# Patient Record
Sex: MALE | Race: WHITE | NOT HISPANIC OR LATINO | URBAN - METROPOLITAN AREA
[De-identification: names, ages, dates, MRNs, and addresses within clinical notes are randomized per-mention and may not be internally consistent; named-entity substitution may affect disease eponyms.]

---

## 2024-02-22 ENCOUNTER — OFFICE VISIT (OUTPATIENT)
Dept: PRIMARY CARE | Facility: CLINIC | Age: 64
End: 2024-02-22
Payer: COMMERCIAL

## 2024-02-22 VITALS
DIASTOLIC BLOOD PRESSURE: 78 MMHG | SYSTOLIC BLOOD PRESSURE: 112 MMHG | WEIGHT: 197 LBS | TEMPERATURE: 98.1 F | HEIGHT: 70 IN | HEART RATE: 78 BPM | BODY MASS INDEX: 28.2 KG/M2

## 2024-02-22 DIAGNOSIS — R73.02 IGT (IMPAIRED GLUCOSE TOLERANCE): ICD-10-CM

## 2024-02-22 DIAGNOSIS — A63.0 VENEREAL WARTS: ICD-10-CM

## 2024-02-22 DIAGNOSIS — E78.2 MIXED HYPERLIPIDEMIA: ICD-10-CM

## 2024-02-22 DIAGNOSIS — K58.9 IRRITABLE BOWEL SYNDROME, UNSPECIFIED TYPE: ICD-10-CM

## 2024-02-22 DIAGNOSIS — Z00.00 WELLNESS EXAMINATION: ICD-10-CM

## 2024-02-22 DIAGNOSIS — D68.59 THROMBOPHILIA (MULTI): ICD-10-CM

## 2024-02-22 DIAGNOSIS — Z12.11 COLON CANCER SCREENING: ICD-10-CM

## 2024-02-22 DIAGNOSIS — Z12.5 PROSTATE CANCER SCREENING: ICD-10-CM

## 2024-02-22 DIAGNOSIS — R73.03 PREDIABETES: ICD-10-CM

## 2024-02-22 DIAGNOSIS — Z00.00 ENCOUNTER FOR ROUTINE HISTORY AND PHYSICAL EXAMINATION OF ADULT: Primary | ICD-10-CM

## 2024-02-22 PROCEDURE — 99396 PREV VISIT EST AGE 40-64: CPT | Performed by: INTERNAL MEDICINE

## 2024-02-22 PROCEDURE — 1036F TOBACCO NON-USER: CPT | Performed by: INTERNAL MEDICINE

## 2024-02-22 RX ORDER — WARFARIN SODIUM 5 MG/1
5 TABLET ORAL
COMMUNITY
Start: 2023-11-21

## 2024-02-22 ASSESSMENT — ENCOUNTER SYMPTOMS
WHEEZING: 0
COUGH: 0
EYE REDNESS: 0
PSYCHIATRIC NEGATIVE: 1
FEVER: 0
APNEA: 0
ARTHRALGIAS: 0
ACTIVITY CHANGE: 0
EYE PAIN: 0
PALPITATIONS: 0
PHOTOPHOBIA: 0
NUMBNESS: 0
LIGHT-HEADEDNESS: 0
EYE DISCHARGE: 0
CHEST TIGHTNESS: 0
STRIDOR: 0
CHILLS: 0
FACIAL ASYMMETRY: 0
ENDOCRINE NEGATIVE: 1
FATIGUE: 0
CHOKING: 0
SHORTNESS OF BREATH: 0
APPETITE CHANGE: 0
DIZZINESS: 0
HEMATOLOGIC/LYMPHATIC NEGATIVE: 1
EYE ITCHING: 0
ALLERGIC/IMMUNOLOGIC NEGATIVE: 1
HEADACHES: 0
DIAPHORESIS: 0
UNEXPECTED WEIGHT CHANGE: 0
GASTROINTESTINAL NEGATIVE: 1

## 2024-02-22 NOTE — PROGRESS NOTES
"Subjective   Patient ID: Guicho Ferrell is a 63 y.o. male who presents for Annual Exam.    HPI check up   Pe  HYPERCOAGULABLE   LIFE LONG COUMADIN    DONE WELL   PSHX RIGHT WRIST REPAIR  TONSILL    FMHX   POS CLOTS    SOCHX      WORK   NO TOBACCO  NO ETOH   CARDIO 10 K STEPS PER DAY   DIET LESS GREENS   KIDS X 2 H    PRE MED   COLONOSCOPY  YEARS    DUE   PSA   NL        Review of Systems   Constitutional:  Negative for activity change, appetite change, chills, diaphoresis, fatigue, fever and unexpected weight change.   HENT: Negative.     Eyes:  Negative for photophobia, pain, discharge, redness, itching and visual disturbance.   Respiratory:  Negative for apnea, cough, choking, chest tightness, shortness of breath, wheezing and stridor.    Cardiovascular:  Negative for chest pain, palpitations and leg swelling.   Gastrointestinal: Negative.    Endocrine: Negative.    Genitourinary: Negative.    Musculoskeletal:  Negative for arthralgias.   Skin: Negative.    Allergic/Immunologic: Negative.    Neurological:  Negative for dizziness, facial asymmetry, light-headedness, numbness and headaches.   Hematological: Negative.         SEE SULTANA    Psychiatric/Behavioral: Negative.         Objective   /78   Pulse 78   Temp 36.7 °C (98.1 °F)   Ht 1.778 m (5' 10\")   Wt 89.4 kg (197 lb)   BMI 28.27 kg/m²     Physical Exam  Constitutional:       Appearance: Normal appearance.   HENT:      Head: Normocephalic and atraumatic.      Right Ear: Tympanic membrane normal.      Left Ear: Tympanic membrane normal.      Nose: Nose normal.   Eyes:      Extraocular Movements: Extraocular movements intact.      Conjunctiva/sclera: Conjunctivae normal.      Pupils: Pupils are equal, round, and reactive to light.   Cardiovascular:      Rate and Rhythm: Normal rate and regular rhythm.      Pulses: Normal pulses.      Heart sounds: Normal heart sounds.   Pulmonary:      Effort: Pulmonary effort is normal.      Breath sounds: " Normal breath sounds.   Abdominal:      General: Abdomen is flat. Bowel sounds are normal.      Palpations: Abdomen is soft.   Genitourinary:     Penis: Normal.       Testes: Normal.      Prostate: Normal.   Musculoskeletal:         General: Normal range of motion.      Cervical back: Normal range of motion and neck supple.   Skin:     General: Skin is warm and dry.   Neurological:      General: No focal deficit present.      Mental Status: Mental status is at baseline.   Psychiatric:         Mood and Affect: Mood normal.         Behavior: Behavior normal.         Thought Content: Thought content normal.         Judgment: Judgment normal.     RECTAL NL PROSTATE FOBT     Assessment/Plan   Diagnoses and all orders for this visit:  Encounter for routine history and physical examination of adult  -     TSH with reflex to Free T4 if abnormal; Future  -     Hemoglobin A1C; Future  Thrombophilia (CMS/HCC)  Comments:  VERY STABLE ON HIS COUMADIN   GOES TO [facility]  Orders:  -     TSH with reflex to Free T4 if abnormal; Future  -     Hemoglobin A1C; Future  Irritable bowel syndrome, unspecified type  Comments:  GOOD  Orders:  -     TSH with reflex to Free T4 if abnormal; Future  -     Hemoglobin A1C; Future  Colon cancer screening  -     Cologuard® colon cancer screening; Future  -     TSH with reflex to Free T4 if abnormal; Future  -     Hemoglobin A1C; Future  Venereal warts  Comments:  SEE DERM  Orders:  -     TSH with reflex to Free T4 if abnormal; Future  -     Hemoglobin A1C; Future  Mixed hyperlipidemia  -     TSH with reflex to Free T4 if abnormal; Future  -     Hemoglobin A1C; Future  IGT (impaired glucose tolerance)  -     TSH with reflex to Free T4 if abnormal; Future  -     Hemoglobin A1C; Future  Wellness examination  -     CBC; Future  -     Lipid Panel; Future  -     Comprehensive Metabolic Panel; Future  -     TSH with reflex to Free T4 if abnormal; Future  -     Hemoglobin A1C; Future  Prostate  cancer screening  -     Prostate Specific Antigen, Screen; Future  -     TSH with reflex to Free T4 if abnormal; Future  -     Hemoglobin A1C; Future  Prediabetes  -     TSH with reflex to Free T4 if abnormal; Future  -     Hemoglobin A1C; Future  -     Hemoglobin A1C; Future  Other orders  -     Follow Up In Primary Care - Established; Future

## 2024-03-05 LAB
ESTIMATED AVERAGE GLUCOSE FOR HBA1C EXTERNAL: 108 MG/DL
HEMOGLOBIN A1C/HEMOGLOBIN TOTAL IN BLOOD EXTERNAL: 5.4 %

## 2024-03-16 LAB — NONINV COLON CA DNA+OCC BLD SCRN STL QL: NEGATIVE

## 2024-12-23 DIAGNOSIS — D68.59 THROMBOPHILIA (MULTI): Primary | ICD-10-CM

## 2024-12-24 RX ORDER — WARFARIN SODIUM 5 MG/1
5 TABLET ORAL
Qty: 40 TABLET | Refills: 0 | Status: SHIPPED | OUTPATIENT
Start: 2024-12-24

## 2025-01-15 DIAGNOSIS — D68.59 THROMBOPHILIA (MULTI): ICD-10-CM

## 2025-01-15 RX ORDER — WARFARIN SODIUM 5 MG/1
5 TABLET ORAL
Qty: 120 TABLET | Refills: 1 | Status: SHIPPED | OUTPATIENT
Start: 2025-01-15

## 2025-02-20 ENCOUNTER — APPOINTMENT (OUTPATIENT)
Dept: PRIMARY CARE | Facility: CLINIC | Age: 65
End: 2025-02-20
Payer: COMMERCIAL

## 2025-02-20 VITALS
OXYGEN SATURATION: 98 % | TEMPERATURE: 97.5 F | BODY MASS INDEX: 28.35 KG/M2 | HEART RATE: 71 BPM | SYSTOLIC BLOOD PRESSURE: 120 MMHG | HEIGHT: 70 IN | WEIGHT: 198 LBS | DIASTOLIC BLOOD PRESSURE: 80 MMHG

## 2025-02-20 DIAGNOSIS — Z00.00 MEDICARE ANNUAL WELLNESS VISIT, SUBSEQUENT: Primary | ICD-10-CM

## 2025-02-20 DIAGNOSIS — M17.12 ARTHRITIS OF LEFT KNEE: ICD-10-CM

## 2025-02-20 DIAGNOSIS — D68.59 THROMBOPHILIA (MULTI): ICD-10-CM

## 2025-02-20 DIAGNOSIS — H61.22 IMPACTED CERUMEN OF LEFT EAR: ICD-10-CM

## 2025-02-20 DIAGNOSIS — Z12.5 PROSTATE CANCER SCREENING: ICD-10-CM

## 2025-02-20 DIAGNOSIS — K58.9 IRRITABLE BOWEL SYNDROME, UNSPECIFIED TYPE: ICD-10-CM

## 2025-02-20 DIAGNOSIS — A63.0 VENEREAL WARTS: ICD-10-CM

## 2025-02-20 DIAGNOSIS — R73.02 IGT (IMPAIRED GLUCOSE TOLERANCE): ICD-10-CM

## 2025-02-20 DIAGNOSIS — E78.2 MIXED HYPERLIPIDEMIA: ICD-10-CM

## 2025-02-20 DIAGNOSIS — Z00.00 WELLNESS EXAMINATION: ICD-10-CM

## 2025-02-20 PROCEDURE — 3008F BODY MASS INDEX DOCD: CPT | Performed by: INTERNAL MEDICINE

## 2025-02-20 PROCEDURE — G0439 PPPS, SUBSEQ VISIT: HCPCS | Performed by: INTERNAL MEDICINE

## 2025-02-20 PROCEDURE — 90732 PPSV23 VACC 2 YRS+ SUBQ/IM: CPT | Performed by: INTERNAL MEDICINE

## 2025-02-20 PROCEDURE — 1159F MED LIST DOCD IN RCRD: CPT | Performed by: INTERNAL MEDICINE

## 2025-02-20 PROCEDURE — 1160F RVW MEDS BY RX/DR IN RCRD: CPT | Performed by: INTERNAL MEDICINE

## 2025-02-20 PROCEDURE — 99397 PER PM REEVAL EST PAT 65+ YR: CPT | Performed by: INTERNAL MEDICINE

## 2025-02-20 PROCEDURE — 90471 IMMUNIZATION ADMIN: CPT | Performed by: INTERNAL MEDICINE

## 2025-02-20 PROCEDURE — 99213 OFFICE O/P EST LOW 20 MIN: CPT | Performed by: INTERNAL MEDICINE

## 2025-02-20 ASSESSMENT — ENCOUNTER SYMPTOMS
DIAPHORESIS: 0
UNEXPECTED WEIGHT CHANGE: 0
ARTHRALGIAS: 0
EYE REDNESS: 0
DIZZINESS: 0
FATIGUE: 0
HEADACHES: 0
SHORTNESS OF BREATH: 0
PALPITATIONS: 0
LIGHT-HEADEDNESS: 0
ENDOCRINE NEGATIVE: 1
CHOKING: 0
FEVER: 0
EYE DISCHARGE: 0
PHOTOPHOBIA: 0
EYE ITCHING: 0
APPETITE CHANGE: 0
ACTIVITY CHANGE: 0
STRIDOR: 0
COUGH: 0
EYE PAIN: 0
FACIAL ASYMMETRY: 0
WHEEZING: 0
PSYCHIATRIC NEGATIVE: 1
NUMBNESS: 0
CHILLS: 0
CHEST TIGHTNESS: 0
HEMATOLOGIC/LYMPHATIC NEGATIVE: 1
GASTROINTESTINAL NEGATIVE: 1
ALLERGIC/IMMUNOLOGIC NEGATIVE: 1
APNEA: 0

## 2025-02-20 ASSESSMENT — PATIENT HEALTH QUESTIONNAIRE - PHQ9
1. LITTLE INTEREST OR PLEASURE IN DOING THINGS: NOT AT ALL
2. FEELING DOWN, DEPRESSED OR HOPELESS: NOT AT ALL
SUM OF ALL RESPONSES TO PHQ9 QUESTIONS 1 AND 2: 0

## 2025-02-20 NOTE — ASSESSMENT & PLAN NOTE
Orders:    TSH with reflex to Free T4 if abnormal; Future    Urinalysis with Reflex Culture and Microscopic; Future    Hemoglobin A1c; Future

## 2025-02-20 NOTE — ASSESSMENT & PLAN NOTE
Hx PE lifelong coumadin, [facility] manages and is responsible     Orders:    TSH with reflex to Free T4 if abnormal; Future    Urinalysis with Reflex Culture and Microscopic; Future    Hemoglobin A1c; Future     Attending Attestation (For Attendings USE Only)...

## 2025-02-20 NOTE — ASSESSMENT & PLAN NOTE
Kelvin maicas 2024  Orders:    TSH with reflex to Free T4 if abnormal; Future    Urinalysis with Reflex Culture and Microscopic; Future    Hemoglobin A1c; Future

## 2025-02-20 NOTE — ASSESSMENT & PLAN NOTE
labs  Orders:    TSH with reflex to Free T4 if abnormal; Future    Urinalysis with Reflex Culture and Microscopic; Future    Hemoglobin A1c; Future

## 2025-02-20 NOTE — ASSESSMENT & PLAN NOTE
discussed  Orders:    TSH with reflex to Free T4 if abnormal; Future    Urinalysis with Reflex Culture and Microscopic; Future    Hemoglobin A1c; Future

## 2025-02-20 NOTE — ASSESSMENT & PLAN NOTE
resolved  Orders:    TSH with reflex to Free T4 if abnormal; Future    Urinalysis with Reflex Culture and Microscopic; Future    Hemoglobin A1c; Future

## 2025-02-20 NOTE — PROGRESS NOTES
Subjective   Reason for Visit: Guicho Ferrell is an 64 y.o. male here for a Medicare Wellness visit.          Reviewed all medications by prescribing practitioner or clinical pharmacist (such as prescriptions, OTCs, herbal therapies and supplements) and documented in the medical record.    HPI  The patient is doing well , feels well, no specific complaints.   Tolerating and taking med's with no significant side effects.   No other issues noted on questions.     PMHX, PSHX, ALL, SOCHX, PRE MED ALL REVIEWED     MMSE 30 30     PHQ 2 NL     NO FALLS NOTED     PREVENTIVE MEDICINE:  Mammogram  Dexa  Psa  Colonoscopy     cologard 2024  VACCINES REVIEWED    he is due for pneumovax and prevnar   PAST MEDICAL HISTORY   Diagnosis Date   BCC (basal cell carcinoma), LLL 2011   Dr Thomas   Bilateral pulmonary embolism 10/2016   DVT LLE 2012   Recurrent 10/2016   History of anticoagulant therapy; lifelong   IBS (irritable bowel syndrome)   Pulmonary embolism 10/2009   Recurrent 10/2016   Thrombophilia     PAST SURGICAL HISTORY   Procedure Laterality Date   REPAIR WRIST FRACTURE   in his teens   TONSILLECTOMY HX     Current Outpatient Medications   Medication Sig Dispense Refill   warfarin (COUMADIN) 5 mg tablet Take 1 tablet by mouth once daily. Or as directed 30 tablet 11     No current facility-administered medications for this visit.     ALLERGIES  No Known Allergies    FAMILY HISTORY   Problem Relation Age of Onset   other (Clots) Father     Social History     Tobacco Use   Smoking status: Never   Smokeless tobacco: Never   Vaping Use   Vaping Use: Never used   Substance Use Topics   Alcohol use: Yes   Comment: on occasion but not to excess   Drug use: No     Patient Care Team:  Mika Lam MD as PCP - General (Internal Medicine)  Mika Lam MD as PCP - [place] [facility] P[facility]     Review of Systems   Constitutional:  Negative for activity change, appetite change, chills, diaphoresis, fatigue, fever and unexpected weight change.  "  HENT: Negative.     Eyes:  Negative for photophobia, pain, discharge, redness, itching and visual disturbance.   Respiratory:  Negative for apnea, cough, choking, chest tightness, shortness of breath, wheezing and stridor.    Cardiovascular:  Negative for chest pain, palpitations and leg swelling.   Gastrointestinal: Negative.    Endocrine: Negative.    Genitourinary: Negative.    Musculoskeletal:  Negative for arthralgias.   Skin: Negative.    Allergic/Immunologic: Negative.    Neurological:  Negative for dizziness, facial asymmetry, light-headedness, numbness and headaches.   Hematological: Negative.    Psychiatric/Behavioral: Negative.         Objective   Vitals:  /80 (BP Location: Left arm, Patient Position: Sitting, BP Cuff Size: Large adult)   Pulse 71   Temp 36.4 °C (97.5 °F) (Temporal)   Ht 1.778 m (5' 10\")   Wt 89.8 kg (198 lb)   SpO2 98%   BMI 28.41 kg/m²       Physical Exam  Constitutional:       Appearance: Normal appearance.   HENT:      Head: Normocephalic and atraumatic.      Right Ear: Tympanic membrane normal.      Left Ear: Tympanic membrane normal. There is impacted cerumen.      Nose: Nose normal.   Eyes:      Extraocular Movements: Extraocular movements intact.      Conjunctiva/sclera: Conjunctivae normal.      Pupils: Pupils are equal, round, and reactive to light.   Cardiovascular:      Rate and Rhythm: Normal rate and regular rhythm.      Pulses: Normal pulses.      Heart sounds: Normal heart sounds.   Pulmonary:      Effort: Pulmonary effort is normal.      Breath sounds: Normal breath sounds.   Abdominal:      General: Abdomen is flat. Bowel sounds are normal.      Palpations: Abdomen is soft.   Musculoskeletal:         General: Normal range of motion.      Cervical back: Normal range of motion and neck supple.   Skin:     General: Skin is warm and dry.   Neurological:      General: No focal deficit present.      Mental Status: He is oriented to person, place, and time. Mental " status is at baseline.   Psychiatric:         Mood and Affect: Mood normal.         Behavior: Behavior normal.         Thought Content: Thought content normal.         Judgment: Judgment normal.         Assessment & Plan  Medicare annual wellness visit, subsequent    Orders:    TSH with reflex to Free T4 if abnormal; Future    Urinalysis with Reflex Culture and Microscopic; Future    Hemoglobin A1c; Future    Thrombophilia (Multi)  Hx PE lifelong coumadin, [facility] manages and is responsible     Orders:    TSH with reflex to Free T4 if abnormal; Future    Urinalysis with Reflex Culture and Microscopic; Future    Hemoglobin A1c; Future    Irritable bowel syndrome, unspecified type  Cologard  nl 2024  Orders:    TSH with reflex to Free T4 if abnormal; Future    Urinalysis with Reflex Culture and Microscopic; Future    Hemoglobin A1c; Future    Venereal warts  resolved  Orders:    TSH with reflex to Free T4 if abnormal; Future    Urinalysis with Reflex Culture and Microscopic; Future    Hemoglobin A1c; Future    Mixed hyperlipidemia  labs  Orders:    TSH with reflex to Free T4 if abnormal; Future    Urinalysis with Reflex Culture and Microscopic; Future    Hemoglobin A1c; Future    IGT (impaired glucose tolerance)  discussed  Orders:    TSH with reflex to Free T4 if abnormal; Future    Urinalysis with Reflex Culture and Microscopic; Future    Hemoglobin A1c; Future    Wellness examination    Orders:    CBC; Future    Lipid Panel; Future    Comprehensive Metabolic Panel; Future    TSH with reflex to Free T4 if abnormal; Future    Urinalysis with Reflex Culture and Microscopic; Future    Hemoglobin A1c; Future    Prostate cancer screening    Orders:    Prostate Specific Antigen, Screen; Future    TSH with reflex to Free T4 if abnormal; Future    Urinalysis with Reflex Culture and Microscopic; Future    Hemoglobin A1c; Future    Impacted cerumen of left ear  Clean       Arthritis of left knee  Follow

## 2025-02-20 NOTE — ASSESSMENT & PLAN NOTE
Orders:    Prostate Specific Antigen, Screen; Future    TSH with reflex to Free T4 if abnormal; Future    Urinalysis with Reflex Culture and Microscopic; Future    Hemoglobin A1c; Future

## 2025-02-21 LAB
ALBUMIN SERPL-MCNC: 4.5 G/DL (ref 3.6–5.1)
ALP SERPL-CCNC: 51 U/L (ref 35–144)
ALT SERPL-CCNC: 32 U/L (ref 9–46)
ANION GAP SERPL CALCULATED.4IONS-SCNC: 8 MMOL/L (CALC) (ref 7–17)
APPEARANCE UR: CLEAR
AST SERPL-CCNC: 25 U/L (ref 10–35)
BACTERIA #/AREA URNS HPF: NORMAL /HPF
BACTERIA UR CULT: NORMAL
BILIRUB SERPL-MCNC: 0.6 MG/DL (ref 0.2–1.2)
BILIRUB UR QL STRIP: NEGATIVE
BUN SERPL-MCNC: 20 MG/DL (ref 7–25)
CALCIUM SERPL-MCNC: 9.2 MG/DL (ref 8.6–10.3)
CHLORIDE SERPL-SCNC: 105 MMOL/L (ref 98–110)
CHOLEST SERPL-MCNC: 204 MG/DL
CHOLEST/HDLC SERPL: 4.5 (CALC)
CO2 SERPL-SCNC: 28 MMOL/L (ref 20–32)
COLOR UR: YELLOW
CREAT SERPL-MCNC: 1.06 MG/DL (ref 0.7–1.35)
EGFRCR SERPLBLD CKD-EPI 2021: 78 ML/MIN/1.73M2
ERYTHROCYTE [DISTWIDTH] IN BLOOD BY AUTOMATED COUNT: 13.1 % (ref 11–15)
EST. AVERAGE GLUCOSE BLD GHB EST-MCNC: 117 MG/DL
EST. AVERAGE GLUCOSE BLD GHB EST-SCNC: 6.5 MMOL/L
GLUCOSE SERPL-MCNC: 95 MG/DL (ref 65–99)
GLUCOSE UR QL STRIP: NEGATIVE
HBA1C MFR BLD: 5.7 % OF TOTAL HGB
HCT VFR BLD AUTO: 47.3 % (ref 38.5–50)
HDLC SERPL-MCNC: 45 MG/DL
HGB BLD-MCNC: 15.6 G/DL (ref 13.2–17.1)
HGB UR QL STRIP: NEGATIVE
HYALINE CASTS #/AREA URNS LPF: NORMAL /LPF
KETONES UR QL STRIP: NEGATIVE
LDLC SERPL CALC-MCNC: 132 MG/DL (CALC)
LEUKOCYTE ESTERASE UR QL STRIP: NEGATIVE
MCH RBC QN AUTO: 29.3 PG (ref 27–33)
MCHC RBC AUTO-ENTMCNC: 33 G/DL (ref 32–36)
MCV RBC AUTO: 88.9 FL (ref 80–100)
NITRITE UR QL STRIP: NEGATIVE
NONHDLC SERPL-MCNC: 159 MG/DL (CALC)
PH UR STRIP: 6.5 [PH] (ref 5–8)
PLATELET # BLD AUTO: 216 THOUSAND/UL (ref 140–400)
PMV BLD REES-ECKER: 9.6 FL (ref 7.5–12.5)
POTASSIUM SERPL-SCNC: 4.9 MMOL/L (ref 3.5–5.3)
PROT SERPL-MCNC: 7.2 G/DL (ref 6.1–8.1)
PROT UR QL STRIP: NEGATIVE
PSA SERPL-MCNC: 0.78 NG/ML
RBC # BLD AUTO: 5.32 MILLION/UL (ref 4.2–5.8)
RBC #/AREA URNS HPF: NORMAL /HPF
SERVICE CMNT-IMP: NORMAL
SODIUM SERPL-SCNC: 141 MMOL/L (ref 135–146)
SP GR UR STRIP: 1.02 (ref 1–1.03)
SQUAMOUS #/AREA URNS HPF: NORMAL /HPF
TRIGL SERPL-MCNC: 152 MG/DL
TSH SERPL-ACNC: 1.76 MIU/L (ref 0.4–4.5)
WBC # BLD AUTO: 6 THOUSAND/UL (ref 3.8–10.8)
WBC #/AREA URNS HPF: NORMAL /HPF

## 2025-03-19 ENCOUNTER — TELEPHONE (OUTPATIENT)
Dept: PRIMARY CARE | Facility: CLINIC | Age: 65
End: 2025-03-19
Payer: COMMERCIAL

## 2025-03-19 NOTE — TELEPHONE ENCOUNTER
----- Message from Mika Lam sent at 3/19/2025 12:59 PM EDT -----  Labs are normal with slight chol and sugar

## 2025-07-14 DIAGNOSIS — D68.59 THROMBOPHILIA (MULTI): ICD-10-CM

## 2025-07-14 RX ORDER — WARFARIN SODIUM 5 MG/1
5 TABLET ORAL EVERY EVENING
Qty: 120 TABLET | Refills: 1 | Status: SHIPPED | OUTPATIENT
Start: 2025-07-14

## 2025-08-21 ENCOUNTER — APPOINTMENT (OUTPATIENT)
Dept: PRIMARY CARE | Facility: CLINIC | Age: 65
End: 2025-08-21
Payer: COMMERCIAL

## 2025-09-04 ENCOUNTER — APPOINTMENT (OUTPATIENT)
Dept: PRIMARY CARE | Facility: CLINIC | Age: 65
End: 2025-09-04
Payer: COMMERCIAL

## 2025-09-04 PROBLEM — Z13.6 SCREENING FOR CARDIOVASCULAR CONDITION: Status: ACTIVE | Noted: 2024-02-22

## 2025-09-04 LAB — POC FINGERSTICK BLOOD GLUCOSE: 99 MG/DL (ref 70–100)

## 2026-03-05 ENCOUNTER — APPOINTMENT (OUTPATIENT)
Dept: PRIMARY CARE | Facility: CLINIC | Age: 66
End: 2026-03-05
Payer: COMMERCIAL